# Patient Record
Sex: MALE | Race: WHITE | HISPANIC OR LATINO | Employment: FULL TIME | URBAN - METROPOLITAN AREA
[De-identification: names, ages, dates, MRNs, and addresses within clinical notes are randomized per-mention and may not be internally consistent; named-entity substitution may affect disease eponyms.]

---

## 2017-09-15 ENCOUNTER — OFFICE VISIT (OUTPATIENT)
Dept: MEDICAL GROUP | Facility: MEDICAL CENTER | Age: 57
End: 2017-09-15
Payer: COMMERCIAL

## 2017-09-15 VITALS
OXYGEN SATURATION: 97 % | HEART RATE: 72 BPM | RESPIRATION RATE: 16 BRPM | WEIGHT: 204 LBS | DIASTOLIC BLOOD PRESSURE: 70 MMHG | TEMPERATURE: 98.2 F | BODY MASS INDEX: 30.92 KG/M2 | HEIGHT: 68 IN | SYSTOLIC BLOOD PRESSURE: 108 MMHG

## 2017-09-15 DIAGNOSIS — K42.9 UMBILICAL HERNIA WITHOUT OBSTRUCTION AND WITHOUT GANGRENE: ICD-10-CM

## 2017-09-15 DIAGNOSIS — Z12.11 COLON CANCER SCREENING: ICD-10-CM

## 2017-09-15 DIAGNOSIS — Z00.00 ANNUAL PHYSICAL EXAM: ICD-10-CM

## 2017-09-15 DIAGNOSIS — E66.9 OBESITY (BMI 30-39.9): ICD-10-CM

## 2017-09-15 PROCEDURE — 99396 PREV VISIT EST AGE 40-64: CPT | Performed by: PHYSICIAN ASSISTANT

## 2017-09-15 ASSESSMENT — PATIENT HEALTH QUESTIONNAIRE - PHQ9: CLINICAL INTERPRETATION OF PHQ2 SCORE: 0

## 2017-09-15 ASSESSMENT — PAIN SCALES - GENERAL: PAINLEVEL: NO PAIN

## 2017-09-15 NOTE — PROGRESS NOTES
"Subjective:   Giles Conway is a 57 y.o. male here today for annual physical.    Annual physical exam  This is a 57-year-old male who is here today for a physical. No complaints except for an umbilical hernia that has been giving him pain intermittently for the past 3 months. He takes no medications regularly. No medical history. He works in [employer]. Has grandkids. Parents are still alive.       Current medicines (including changes today)  No current outpatient prescriptions on file.     No current facility-administered medications for this visit.      He  has no past medical history on file.    ROS   No chest pain, no shortness of breath, no abdominal pain and all other systems were reviewed and are negative.       Objective:     Blood pressure 108/70, pulse 72, temperature 36.8 °C (98.2 °F), resp. rate 16, height 1.715 m (5' 7.5\"), weight 92.5 kg (204 lb), SpO2 97 %. Body mass index is 31.48 kg/m².   Physical Exam:  Constitutional: Alert, no distress.  Skin: Warm, dry, good turgor, no rashes in visible areas.  Eye: Equal, round and reactive, conjunctiva clear, lids normal.  ENMT: Lips without lesions, good dentition, oropharynx clear.  Neck: Trachea midline, no masses.   Lymph: No cervical or supraclavicular lymphadenopathy  Respiratory: Unlabored respiratory effort, lungs clear to auscultation, no wheezes, no ronchi.  Cardiovascular: Normal S1, S2, no murmur, no edema.  Abdomen: Soft, non-tender, Umbilical protrusion noted.  Psych: Alert and oriented x3, normal affect and mood.        Assessment and Plan:   The following treatment plan was discussed    1. Annual physical exam  Order labs fasting. He will be contacted with the results.  - CBC WITH DIFFERENTIAL; Future  - COMP METABOLIC PANEL; Future  - HEMOGLOBIN A1C; Future  - LIPID PANEL  - PROSTATE SPECIFIC AG    2. Obesity (BMI 30-39.9)  Advised exercise routinely.  - Patient identified as having weight management issue.  Appropriate orders and " counseling given.    3. Colon cancer screening  Referred to GI for colonoscopy.  - REFERRAL TO GI FOR COLONOSCOPY    4. Umbilical hernia without obstruction and without gangrene  Referred to [facility] for further evaluation.  - REFERRAL TO GENERAL SURGERY      Followup: Return if symptoms worsen or fail to improve.    Please note that this dictation was created using voice recognition software. I have made every reasonable attempt to correct obvious errors, but I expect that there are errors of grammar and possibly content that I did not discover before finalizing the note.

## 2017-09-16 ENCOUNTER — HOSPITAL ENCOUNTER (OUTPATIENT)
Dept: LAB | Facility: MEDICAL CENTER | Age: 57
End: 2017-09-16
Attending: PHYSICIAN ASSISTANT
Payer: COMMERCIAL

## 2017-09-16 DIAGNOSIS — Z00.00 ANNUAL PHYSICAL EXAM: ICD-10-CM

## 2017-09-16 LAB
ALBUMIN SERPL BCP-MCNC: 4.3 G/DL (ref 3.2–4.9)
ALBUMIN/GLOB SERPL: 1.5 G/DL
ALP SERPL-CCNC: 64 U/L (ref 30–99)
ALT SERPL-CCNC: 24 U/L (ref 2–50)
ANION GAP SERPL CALC-SCNC: 8 MMOL/L (ref 0–11.9)
AST SERPL-CCNC: 20 U/L (ref 12–45)
BASOPHILS # BLD AUTO: 0.5 % (ref 0–1.8)
BASOPHILS # BLD: 0.03 K/UL (ref 0–0.12)
BILIRUB SERPL-MCNC: 1 MG/DL (ref 0.1–1.5)
BUN SERPL-MCNC: 15 MG/DL (ref 8–22)
CALCIUM SERPL-MCNC: 9.5 MG/DL (ref 8.5–10.5)
CHLORIDE SERPL-SCNC: 105 MMOL/L (ref 96–112)
CHOLEST SERPL-MCNC: 170 MG/DL (ref 100–199)
CO2 SERPL-SCNC: 28 MMOL/L (ref 20–33)
CREAT SERPL-MCNC: 1.18 MG/DL (ref 0.5–1.4)
EOSINOPHIL # BLD AUTO: 0.19 K/UL (ref 0–0.51)
EOSINOPHIL NFR BLD: 2.9 % (ref 0–6.9)
ERYTHROCYTE [DISTWIDTH] IN BLOOD BY AUTOMATED COUNT: 38.3 FL (ref 35.9–50)
EST. AVERAGE GLUCOSE BLD GHB EST-MCNC: 117 MG/DL
GFR SERPL CREATININE-BSD FRML MDRD: >60 ML/MIN/1.73 M 2
GLOBULIN SER CALC-MCNC: 2.9 G/DL (ref 1.9–3.5)
GLUCOSE SERPL-MCNC: 91 MG/DL (ref 65–99)
HBA1C MFR BLD: 5.7 % (ref 0–5.6)
HCT VFR BLD AUTO: 52.2 % (ref 42–52)
HDLC SERPL-MCNC: 32 MG/DL
HGB BLD-MCNC: 17.3 G/DL (ref 14–18)
IMM GRANULOCYTES # BLD AUTO: 0.02 K/UL (ref 0–0.11)
IMM GRANULOCYTES NFR BLD AUTO: 0.3 % (ref 0–0.9)
LDLC SERPL CALC-MCNC: 114 MG/DL
LYMPHOCYTES # BLD AUTO: 2.33 K/UL (ref 1–4.8)
LYMPHOCYTES NFR BLD: 36 % (ref 22–41)
MCH RBC QN AUTO: 29 PG (ref 27–33)
MCHC RBC AUTO-ENTMCNC: 33.1 G/DL (ref 33.7–35.3)
MCV RBC AUTO: 87.6 FL (ref 81.4–97.8)
MONOCYTES # BLD AUTO: 0.39 K/UL (ref 0–0.85)
MONOCYTES NFR BLD AUTO: 6 % (ref 0–13.4)
NEUTROPHILS # BLD AUTO: 3.51 K/UL (ref 1.82–7.42)
NEUTROPHILS NFR BLD: 54.3 % (ref 44–72)
NRBC # BLD AUTO: 0 K/UL
NRBC BLD AUTO-RTO: 0 /100 WBC
PLATELET # BLD AUTO: 235 K/UL (ref 164–446)
PMV BLD AUTO: 11.6 FL (ref 9–12.9)
POTASSIUM SERPL-SCNC: 4.9 MMOL/L (ref 3.6–5.5)
PROT SERPL-MCNC: 7.2 G/DL (ref 6–8.2)
PSA SERPL-MCNC: 0.41 NG/ML (ref 0–4)
RBC # BLD AUTO: 5.96 M/UL (ref 4.7–6.1)
SODIUM SERPL-SCNC: 141 MMOL/L (ref 135–145)
TRIGL SERPL-MCNC: 122 MG/DL (ref 0–149)
WBC # BLD AUTO: 6.5 K/UL (ref 4.8–10.8)

## 2017-09-16 PROCEDURE — 80061 LIPID PANEL: CPT

## 2017-09-16 PROCEDURE — 85025 COMPLETE CBC W/AUTO DIFF WBC: CPT

## 2017-09-16 PROCEDURE — 83036 HEMOGLOBIN GLYCOSYLATED A1C: CPT

## 2017-09-16 PROCEDURE — 36415 COLL VENOUS BLD VENIPUNCTURE: CPT

## 2017-09-16 PROCEDURE — 80053 COMPREHEN METABOLIC PANEL: CPT

## 2017-09-16 PROCEDURE — 84153 ASSAY OF PSA TOTAL: CPT

## 2017-09-17 PROBLEM — R73.03 PREDIABETES: Status: ACTIVE | Noted: 2017-09-17

## 2017-09-18 ENCOUNTER — TELEPHONE (OUTPATIENT)
Dept: MEDICAL GROUP | Facility: MEDICAL CENTER | Age: 57
End: 2017-09-18

## 2017-09-18 NOTE — TELEPHONE ENCOUNTER
Phone Number Called: [phone] (home)     Message: Left message for patient to call back.    Left Message for patient to call back: yes

## 2017-09-18 NOTE — TELEPHONE ENCOUNTER
----- Message from Mario Reagan P.A.-C. sent at 9/17/2017  3:34 PM PDT -----  Please contact.  Happy with lab results.  Does have prediabetes, the beginning stages.  Other labs normal.  Exercise routinely.  F/u for annual labs.  Thank you.    Mario

## 2017-09-21 NOTE — TELEPHONE ENCOUNTER
Phone Number Called: [phone] (home)     Message: Left message notifying patient of the labs.    Left Message for patient to call back: no

## 2017-10-30 ENCOUNTER — OFFICE VISIT (OUTPATIENT)
Dept: MEDICAL GROUP | Facility: MEDICAL CENTER | Age: 57
End: 2017-10-30
Payer: COMMERCIAL

## 2017-10-30 VITALS
TEMPERATURE: 98 F | OXYGEN SATURATION: 96 % | SYSTOLIC BLOOD PRESSURE: 118 MMHG | BODY MASS INDEX: 30.31 KG/M2 | RESPIRATION RATE: 16 BRPM | HEIGHT: 68 IN | DIASTOLIC BLOOD PRESSURE: 74 MMHG | WEIGHT: 200 LBS | HEART RATE: 80 BPM

## 2017-10-30 DIAGNOSIS — K42.9 UMBILICAL HERNIA WITHOUT OBSTRUCTION AND WITHOUT GANGRENE: ICD-10-CM

## 2017-10-30 DIAGNOSIS — R73.03 PREDIABETES: ICD-10-CM

## 2017-10-30 DIAGNOSIS — Z48.02 ENCOUNTER FOR REMOVAL OF SUTURES: ICD-10-CM

## 2017-10-30 PROCEDURE — 99213 OFFICE O/P EST LOW 20 MIN: CPT | Performed by: PHYSICIAN ASSISTANT

## 2017-10-30 NOTE — ASSESSMENT & PLAN NOTE
This is a 57-year-old male who was in [place] and developed pain of his umbilical hernia. In [place] he had surgery. Was told after the surgery to follow up with his primary care to have the sutures removed. He denies any abdominal pain. Is not planning to return to [place] in time soon.

## 2017-10-30 NOTE — PROGRESS NOTES
"Subjective:   Giles Conway is a 57 y.o. male here today for suture removal status post umbilical hernia repair.    Encounter for removal of sutures  This is a 57-year-old male who was in [place] and developed pain of his umbilical hernia. In [place] he had surgery. Was told after the surgery to follow up with his primary care to have the sutures removed. He denies any abdominal pain. Is not planning to return to [place] in time soon.    Prediabetes  Last lab showed his A1c at 5.9.       Current medicines (including changes today)  No current outpatient prescriptions on file.     No current facility-administered medications for this visit.      He  has no past medical history on file.    ROS   No chest pain, no shortness of breath, no abdominal pain and all other systems were reviewed and are negative.       Objective:     Blood pressure 118/74, pulse 80, temperature 36.7 °C (98 °F), resp. rate 16, height 1.715 m (5' 7.5\"), weight 90.7 kg (200 lb), SpO2 96 %. Body mass index is 30.86 kg/m².   Physical Exam:  Constitutional: Alert, no distress.  Skin: Warm, dry, good turgor, no rashes in visible areas.  Eye: Equal, round and reactive, conjunctiva clear, lids normal.  ENMT: Lips without lesions, good dentition, oropharynx clear.  Neck: Trachea midline, no masses.   Lymph: No cervical or supraclavicular lymphadenopathy  Respiratory: Unlabored respiratory effort, lungs appear clear, no wheezes.  Cardiovascular: Regular rate and rhythm.  Abdomen: Soft, non-tender, no masses. There are 2 black sutures that are knots. Unknown if other sutures deeper in the wall of the umbilical. Also the area appears very hard. Non-erythematous.  Psych: Alert and oriented x3, normal affect and mood.        Assessment and Plan:   The following treatment plan was discussed    1. Encounter for removal of sutures  It's been 9 days postop. Referred to general surgery urgently for suture removal. Advised to contact the surgeon who did " the repair to find out how the sutures need to be removed. May go to the ED with any concerns.  - REFERRAL TO GENERAL SURGERY    2. Umbilical hernia without obstruction and without gangrene  Resolved status post hernia repair.  - REFERRAL TO GENERAL SURGERY    3. Prediabetes  New-onset condition. Advised exercise routinely. Eat healthier. Will monitor.      Followup: No Follow-up on file.    Please note that this dictation was created using voice recognition software. I have made every reasonable attempt to correct obvious errors, but I expect that there are errors of grammar and possibly content that I did not discover before finalizing the note.

## 2021-01-25 ENCOUNTER — TELEPHONE (OUTPATIENT)
Dept: SCHEDULING | Facility: IMAGING CENTER | Age: 61
End: 2021-01-25

## 2021-02-18 PROBLEM — Z00.00 ANNUAL PHYSICAL EXAM: Status: RESOLVED | Noted: 2017-09-15 | Resolved: 2021-02-18

## 2021-02-18 PROBLEM — Z48.02 ENCOUNTER FOR REMOVAL OF SUTURES: Status: RESOLVED | Noted: 2017-10-30 | Resolved: 2021-02-18

## 2021-02-18 ASSESSMENT — ENCOUNTER SYMPTOMS
NAUSEA: 0
WEAKNESS: 0
DEPRESSION: 0
BLURRED VISION: 0
FEVER: 0
VOMITING: 0
DIARRHEA: 0
SHORTNESS OF BREATH: 0
PALPITATIONS: 0
CHILLS: 0
CONSTIPATION: 0

## 2021-02-18 NOTE — PROGRESS NOTES
History of Present Illness  60 year old male presents to clinic to establish care. He has a history of prediabetes, which is being treated with lifestyle.  He does not check his blood glucose at home.    He does not take any prescription medication, and is doing generally well.    He denies any other questions or concerns at this time.    ROS  Review of Systems   Constitutional: Negative for chills and fever.   HENT: Negative for hearing loss.    Eyes: Negative for blurred vision.   Respiratory: Negative for shortness of breath.    Cardiovascular: Negative for chest pain and palpitations.   Gastrointestinal: Negative for constipation, diarrhea, nausea and vomiting.   Genitourinary: Negative for dysuria and hematuria.   Skin: Negative for rash.   Neurological: Negative for weakness.   Psychiatric/Behavioral: Negative for depression.     Medications  No current outpatient medications on file.     No current facility-administered medications for this visit.     Allergies  No Known Allergies    Problem List  Patient Active Problem List   Diagnosis   • Obesity (BMI 30-39.9)   • Umbilical hernia without obstruction and without gangrene   • Prediabetes     Past Medical History  History reviewed. No pertinent past medical history.     Past Surgical History  History reviewed. No pertinent surgical history.     Past Family History  Family History   Problem Relation Age of Onset   • Hypertension Mother    • Cancer Father         Colon, diagnosed @ 89   • No Known Problems Sister    • No Known Problems Brother    • No Known Problems Sister    • No Known Problems Sister    • No Known Problems Sister    • No Known Problems Sister    • No Known Problems Brother    • No Known Problems Brother    • No Known Problems Brother    • No Known Problems Brother    • No Known Problems Son    • No Known Problems Daughter    • No Known Problems Son    • No Known Problems Son      Social History  He reports eating a healthy and balanced diet,  "as well as getting regular exercise, particularly through running. He works full time in a sheet metal shop doing . He drinks an average of 2-3 alcoholic beverages per week. He denies any tobacco product or illicit drug use. He is sexually active with one, female partner and his wife has had a tubal ligation for contraception.     Physical Exam  /68 (BP Location: Left arm, Patient Position: Sitting, BP Cuff Size: Adult)   Pulse 71   Temp 36.7 °C (98 °F)   Resp 14   Ht 1.702 m (5' 7\")   Wt 87 kg (191 lb 12.8 oz)   SpO2 97%   BMI 30.04 kg/m²   Physical Exam   Constitutional: He is well-developed, well-nourished, and in no distress. No distress.   HENT:   Head: Normocephalic and atraumatic.   Right Ear: Tympanic membrane, external ear and ear canal normal.   Left Ear: Tympanic membrane, external ear and ear canal normal.   Eyes: Pupils are equal, round, and reactive to light. Right eye exhibits no discharge. Left eye exhibits no discharge. No scleral icterus.   Neck: No thyromegaly present.   Cardiovascular: Normal rate, regular rhythm and normal heart sounds.   Pulmonary/Chest: Effort normal and breath sounds normal. No respiratory distress.   Abdominal: Soft. Bowel sounds are normal. He exhibits no distension. There is no abdominal tenderness.   Musculoskeletal:         General: No edema.   Neurological: He is alert.   Skin: Skin is warm and dry. He is not diaphoretic.   Psychiatric: Affect and judgment normal.     Assessment & Plan  1. Visit for preventive health examination  2. Obesity (BMI 30-39.9)  3. Colon cancer screening  4. Need for vaccination  5. Need for hepatitis C screening test  Health maintenance status reviewed and updated. We discussed diet, exercise, vaccinations, skin cancer prevention and detection, seat belt use, and regular eye and dental exams.  - Comp Metabolic Panel; Future  - Lipid Profile; Future  - HEMOGLOBIN A1C; Future  - HCV Scrn ( [id number] " 1xLife); Future  - REFERRAL TO GI FOR COLONOSCOPY  - Tdap =>8yo IM    6. Prediabetes  Chronic and stable.  We will get updated hemoglobin A1c and prescribe medication as appropriate.  - HEMOGLOBIN A1C; Future    Return in about 1 year (around 2/23/2022) for Annual physical.    Ama Juarez M.D.

## 2021-02-23 ENCOUNTER — OFFICE VISIT (OUTPATIENT)
Dept: MEDICAL GROUP | Facility: MEDICAL CENTER | Age: 61
End: 2021-02-23
Payer: COMMERCIAL

## 2021-02-23 VITALS
OXYGEN SATURATION: 97 % | WEIGHT: 191.8 LBS | RESPIRATION RATE: 14 BRPM | TEMPERATURE: 98 F | BODY MASS INDEX: 30.1 KG/M2 | HEART RATE: 71 BPM | HEIGHT: 67 IN | DIASTOLIC BLOOD PRESSURE: 68 MMHG | SYSTOLIC BLOOD PRESSURE: 106 MMHG

## 2021-02-23 DIAGNOSIS — R73.03 PREDIABETES: ICD-10-CM

## 2021-02-23 DIAGNOSIS — Z11.59 NEED FOR HEPATITIS C SCREENING TEST: ICD-10-CM

## 2021-02-23 DIAGNOSIS — Z12.11 COLON CANCER SCREENING: ICD-10-CM

## 2021-02-23 DIAGNOSIS — Z00.00 VISIT FOR PREVENTIVE HEALTH EXAMINATION: ICD-10-CM

## 2021-02-23 DIAGNOSIS — E66.9 OBESITY (BMI 30-39.9): ICD-10-CM

## 2021-02-23 DIAGNOSIS — Z23 NEED FOR VACCINATION: ICD-10-CM

## 2021-02-23 PROCEDURE — 90471 IMMUNIZATION ADMIN: CPT | Performed by: FAMILY MEDICINE

## 2021-02-23 PROCEDURE — 99386 PREV VISIT NEW AGE 40-64: CPT | Mod: 25 | Performed by: FAMILY MEDICINE

## 2021-02-23 PROCEDURE — 90715 TDAP VACCINE 7 YRS/> IM: CPT | Performed by: FAMILY MEDICINE

## 2021-02-23 ASSESSMENT — PATIENT HEALTH QUESTIONNAIRE - PHQ9: CLINICAL INTERPRETATION OF PHQ2 SCORE: 0

## 2021-03-15 ENCOUNTER — HOSPITAL ENCOUNTER (OUTPATIENT)
Dept: LAB | Facility: MEDICAL CENTER | Age: 61
End: 2021-03-15
Attending: FAMILY MEDICINE
Payer: COMMERCIAL

## 2021-03-15 DIAGNOSIS — Z00.00 VISIT FOR PREVENTIVE HEALTH EXAMINATION: ICD-10-CM

## 2021-03-15 DIAGNOSIS — R73.03 PREDIABETES: ICD-10-CM

## 2021-03-15 DIAGNOSIS — Z11.59 NEED FOR HEPATITIS C SCREENING TEST: ICD-10-CM

## 2021-03-15 DIAGNOSIS — Z23 NEED FOR VACCINATION: ICD-10-CM

## 2021-03-15 LAB
ALBUMIN SERPL BCP-MCNC: 4.5 G/DL (ref 3.2–4.9)
ALBUMIN/GLOB SERPL: 1.7 G/DL
ALP SERPL-CCNC: 78 U/L (ref 30–99)
ALT SERPL-CCNC: 20 U/L (ref 2–50)
ANION GAP SERPL CALC-SCNC: 11 MMOL/L (ref 7–16)
AST SERPL-CCNC: 18 U/L (ref 12–45)
BILIRUB SERPL-MCNC: 1.1 MG/DL (ref 0.1–1.5)
BUN SERPL-MCNC: 14 MG/DL (ref 8–22)
CALCIUM SERPL-MCNC: 9.3 MG/DL (ref 8.4–10.2)
CHLORIDE SERPL-SCNC: 101 MMOL/L (ref 96–112)
CHOLEST SERPL-MCNC: 153 MG/DL (ref 100–199)
CO2 SERPL-SCNC: 25 MMOL/L (ref 20–33)
CREAT SERPL-MCNC: 1.04 MG/DL (ref 0.5–1.4)
EST. AVERAGE GLUCOSE BLD GHB EST-MCNC: 131 MG/DL
FASTING STATUS PATIENT QL REPORTED: NORMAL
GLOBULIN SER CALC-MCNC: 2.7 G/DL (ref 1.9–3.5)
GLUCOSE SERPL-MCNC: 96 MG/DL (ref 65–99)
HBA1C MFR BLD: 6.2 % (ref 4–5.6)
HCV AB SER QL: NORMAL
HDLC SERPL-MCNC: 42 MG/DL
LDLC SERPL CALC-MCNC: 98 MG/DL
POTASSIUM SERPL-SCNC: 4 MMOL/L (ref 3.6–5.5)
PROT SERPL-MCNC: 7.2 G/DL (ref 6–8.2)
SODIUM SERPL-SCNC: 137 MMOL/L (ref 135–145)
TRIGL SERPL-MCNC: 66 MG/DL (ref 0–149)

## 2021-03-15 PROCEDURE — 83036 HEMOGLOBIN GLYCOSYLATED A1C: CPT

## 2021-03-15 PROCEDURE — 80061 LIPID PANEL: CPT

## 2021-03-15 PROCEDURE — 80053 COMPREHEN METABOLIC PANEL: CPT

## 2021-03-15 PROCEDURE — G0472 HEP C SCREEN HIGH RISK/OTHER: HCPCS

## 2021-03-15 PROCEDURE — 36415 COLL VENOUS BLD VENIPUNCTURE: CPT

## 2021-03-26 ENCOUNTER — TELEPHONE (OUTPATIENT)
Dept: MEDICAL GROUP | Facility: MEDICAL CENTER | Age: 61
End: 2021-03-26

## 2021-03-26 NOTE — TELEPHONE ENCOUNTER
1. Name: Giles Johnson      Call Back Number: [phone] (home)         How would the patient prefer to be contacted with a response: phone    Pt Giles called In and LVM saying he needed a Kit for Colonoscopy. He needs to know if he can pick one up or where he needs to go. Please call him and let him know.    Thanks

## 2022-05-02 ENCOUNTER — APPOINTMENT (OUTPATIENT)
Dept: MEDICAL GROUP | Facility: MEDICAL CENTER | Age: 62
End: 2022-05-02